# Patient Record
Sex: FEMALE | Race: ASIAN | NOT HISPANIC OR LATINO | URBAN - METROPOLITAN AREA
[De-identification: names, ages, dates, MRNs, and addresses within clinical notes are randomized per-mention and may not be internally consistent; named-entity substitution may affect disease eponyms.]

---

## 2023-02-27 LAB
CALCIDIOL (25 OH VITAMIN D3) (NG/ML) IN SER/PLAS: 22 NG/ML
ERYTHROCYTE DISTRIBUTION WIDTH (RATIO) BY AUTOMATED COUNT: 13.2 % (ref 11.5–14.5)
ERYTHROCYTE MEAN CORPUSCULAR HEMOGLOBIN CONCENTRATION (G/DL) BY AUTOMATED: 32.9 G/DL (ref 32–36)
ERYTHROCYTE MEAN CORPUSCULAR VOLUME (FL) BY AUTOMATED COUNT: 101 FL (ref 80–100)
ERYTHROCYTES (10*6/UL) IN BLOOD BY AUTOMATED COUNT: 3.56 X10E12/L (ref 4–5.2)
GLUCOSE, 1 HR SCREEN, PREG: 131 MG/DL
HEMATOCRIT (%) IN BLOOD BY AUTOMATED COUNT: 35.9 % (ref 36–46)
HEMOGLOBIN (G/DL) IN BLOOD: 11.8 G/DL (ref 12–16)
LEUKOCYTES (10*3/UL) IN BLOOD BY AUTOMATED COUNT: 9.4 X10E9/L (ref 4.4–11.3)
PLATELETS (10*3/UL) IN BLOOD AUTOMATED COUNT: 258 X10E9/L (ref 150–450)
REFLEX ADDED, ANEMIA PANEL: ABNORMAL
SYPHILIS TOTAL AB: NONREACTIVE

## 2023-05-20 LAB — GROUP B STREP SCREEN: NORMAL

## 2023-05-25 ENCOUNTER — HOSPITAL ENCOUNTER (OUTPATIENT)
Dept: DATA CONVERSION | Facility: HOSPITAL | Age: 38
End: 2023-05-25
Attending: OBSTETRICS & GYNECOLOGY
Payer: COMMERCIAL

## 2023-05-25 DIAGNOSIS — O09.523 SUPERVISION OF ELDERLY MULTIGRAVIDA, THIRD TRIMESTER (HHS-HCC): ICD-10-CM

## 2023-05-25 DIAGNOSIS — O26.853 SPOTTING COMPLICATING PREGNANCY, THIRD TRIMESTER (HHS-HCC): ICD-10-CM

## 2023-05-25 DIAGNOSIS — Z34.80 ENCOUNTER FOR SUPERVISION OF OTHER NORMAL PREGNANCY, UNSPECIFIED TRIMESTER (HHS-HCC): ICD-10-CM

## 2023-05-25 DIAGNOSIS — Z3A.38 38 WEEKS GESTATION OF PREGNANCY (HHS-HCC): ICD-10-CM

## 2023-05-25 DIAGNOSIS — O46.93 ANTEPARTUM HEMORRHAGE, UNSPECIFIED, THIRD TRIMESTER (HHS-HCC): ICD-10-CM

## 2023-06-29 LAB
CLUE CELLS: NORMAL
NUGENT SCORE: 2
YEAST: NORMAL

## 2023-09-07 VITALS — BODY MASS INDEX: 26.1 KG/M2 | HEIGHT: 61 IN | WEIGHT: 138.23 LBS

## 2023-09-12 LAB
CLUE CELLS: ABNORMAL
NUGENT SCORE: 4
VAGINITIS-BV + YEAST INTERPRETATION: ABNORMAL
YEAST: ABNORMAL

## 2023-09-30 NOTE — PROGRESS NOTES
Current Stage:   Stage: Triage     OB Dating:   EDC/EGA:  ·  Final DION 2023   ·  EGA 38     Subjective Data:   Antepartum:  Antepartum:    This is a 39yo  @ 38wks by LMP c/w 13wk US here for light pink spotting seen around 7am with continued spotting seeing in her underwear. Patient states  that she wiped while using the restroom and noted pink spots on the toliet paper. Otherwise she has not had any LOF and endorses intermittent contractions and good fetal movement. She does not report dizziness, HA, nausea, vomiting, chest pain, or SOB.     Pregnancy n/f: complications (infections,  U/S concerns, multiple gestation)  - AMA rr cfDNA  - BV, treated  - Yeast infection, treated    Name of OB Physician: Carlos  Last appointment: 23 SVE 1/L/H  Next appointment: 23    OBHX  2013  @39wks 6#2 F  PMHx: Vitamin D Insufficiency  Gyn hx: Normal Pap hx  Past Pregnancies:   PSH: Lasix eye surgey  Meds: PNV                                            Allergies: NKDa  Family Hx: Reviewed and noncontributory   Soc: Denies t/e/d                    Objective Information:    Objective Information:      T   P  R  BP   MAP  SpO2   Value  36.4  95  16  110/63   81  98%  Date/Time  9:05  9:05  9:05  9:05   9:05  9:05  Range  (36.4C - 36.4C )  (95 - 95 )  (16 - 16 )  (110 - 110 )/ (63 - 63 )  (81 - 81 )  (98% - 98% )      Pain reported at  9:05: 0 = None      T   P  R  BP   MAP  SpO2   Value  36.4  95  16  110/63   81  98%  Date/Time  9:05  9:05  9:05  9:05   9:05  9:05  Range  (36.4C - 36.4C )  (95 - 95 )  (16 - 16 )  (110 - 110 )/ (63 - 63 )  (81 - 81 )  (98% - 98% )      Physical Exam:   Constitutional: Alert, oriented. NAD   Obstetric: SVE: 1.5/L/H, 1cc blood  FHT: 155, mod, +accels, - decels  Eitzen: q5-20mins, very intermittent   Eyes: no scleral icterus   Head/Neck: Normal, cephalic, atraumatic   Respiratory/Thorax: Normal work of breathing.    Gastrointestinal: Gravid uterus, nontender   Musculoskeletal: Normal extremities. Moving all extremities  equally   Neurological: No focal neurologic deficits   Psychological: Appropriate affect. Mood congruent   Skin: No rashes or lesions      Testing:   NST Interpretation - Baby A:  ·  Baseline    ·  Variability moderate (amplitude range 6 to 25 bpm)   ·  Interpretation Reactive (2 15x15 accels)   ·  Accelerations +   ·  Decelerations -     Assessment and Plan:   Assessment:    39yo  @ 38wks by LMP c/w 13wk US here for light pink spotting seen around 7am while toileting and continued spotting seen in her underwear.    VB  - No significant bleeding and bleeding stopped in triage  - Cx exam stable x 2   - Given hx and physical exam finding, bleeding likely due to cervical change    # NST reactive, reassuring    Discussed with Dr. Gonzalez. Stable for discharge.    Ailyn Pantoja, MS4          Attestation:   Note Completion:  I am a:  Medical Student/Acting Intern   Medical Student Attestation I, or a resident under my supervision, was present with the medical student who participated in the documentation of this note.  I have personally seen and examined the patient and performed the medical decision-making components. I have reviewed the medical student documentation and/or resident documentation and verified the findings in the note as written with additions or exceptions  as stated in the body of this note.    I personally evaluated the patient on 25-May-2023         Electronic Signatures:  Ailyn Pantoja (MED STUD)  (Signed 25-May-2023 10:55)   Authored: Current Stage, OB Dating, Subjective Data,  Objective Data,  Testing, Assessment and Plan, Note Completion  Luna Gonzalez)  (Signed 25-May-2023 17:17)   Authored: Assessment and Plan, Note Completion   Co-Signer: Subjective Data, Objective Data, Assessment and Plan, Note Completion      Last Updated: 25-May-2023 17:17 by Carlos  Luna BOYD)

## 2023-11-07 ENCOUNTER — APPOINTMENT (OUTPATIENT)
Dept: PHYSICAL THERAPY | Facility: CLINIC | Age: 38
End: 2023-11-07
Payer: COMMERCIAL

## 2023-11-28 ENCOUNTER — APPOINTMENT (OUTPATIENT)
Dept: OBSTETRICS AND GYNECOLOGY | Facility: CLINIC | Age: 38
End: 2023-11-28
Payer: COMMERCIAL